# Patient Record
(demographics unavailable — no encounter records)

---

## 2025-02-20 NOTE — IMAGING
[de-identified] : Back / Spine: Inspection: no erythema and ecchymosis. Palpation: right lumbar paraspinal tenderness.  Range of motion:  Near full range of motion but with mild stiffness. Pain with lateral rotation. pain at extremes of flexion Strength Testing: Weakness with right ankle dorsiflexion and EHL strength Otherwise 5/5 throughout both lower extremities with normal tone  Neurological testing: light touch is intact throughout both lower extremities Straight Leg Raise: positive right [place] test: neg   RIGHT HIP and THIGH Inspection:  no ecchymosis, no rashes  Palpation: greater trochanter tenderness Range of Motion:  pain with external rotation Strength: 5/5 Flexion, 5/5 Exenstion, 5//5 Abduction, 5/5/ Adduction  Neurological: light touch is intact throughout Special Tests:  Impingement: neg Groin pain with IR: neg Leyla: pos Pain in posterior thigh with leg lift and knee bent: neg

## 2025-02-20 NOTE — ASSESSMENT
[FreeTextEntry1] : X-Ray Examination of the LUMBAR SPINE 2 views shows: disc space narrowing X-Ray Examination of the PELVIS 1 or 2 views shows: there are no fractures, subluxations or dislocations. deg changes   - We discussed their diagnosis and treatment options at length including the risks and benefits of both surgical and non-surgical options. Surgical risks include but are not limited to pain, infection, bleeding, vascular injury, numbness, tingling, nerve damage. - Due to risks of surgery, they will continue conservative treatment with PT and anti-inflammatory medications. - Rx given for Medrol dose pack - Discussed the possible side effects of medication along with the timing and frequency for taking. - fu in 6 weeks, If they do not make an appropriate improvement with therapy we discussed that we will obtain and MRI at their next visit.

## 2025-02-20 NOTE — HISTORY OF PRESENT ILLNESS
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 Has tried Advil, ibuprofen , HEP, PT in past with mild relief, working with

## 2025-04-17 NOTE — IMAGING
[de-identified] : Back / Spine: Inspection: no erythema and ecchymosis. Palpation: right lumbar paraspinal tenderness.  Range of motion:  Near full range of motion but with mild stiffness. Pain with lateral rotation. pain at extremes of flexion Strength Testing: Weakness with right ankle dorsiflexion and EHL strength Otherwise 5/5 throughout both lower extremities with normal tone  Neurological testing: light touch is intact throughout both lower extremities Straight Leg Raise: positive right [place] test: neg   RIGHT HIP and THIGH Inspection:  no ecchymosis, no rashes  Palpation: greater trochanter tenderness Range of Motion:  pain with external rotation Strength: 5/5 Flexion, 5/5 Exenstion, 5//5 Abduction, 5/5/ Adduction  Neurological: light touch is intact throughout Special Tests:  Impingement: neg Groin pain with IR: neg Leyla: pos Pain in posterior thigh with leg lift and knee bent: neg

## 2025-04-17 NOTE — ASSESSMENT
[FreeTextEntry1] :    - We discussed their diagnosis and treatment options at length including the risks and benefits of both surgical and non-surgical options. Surgical risks include but are not limited to pain, infection, bleeding, vascular injury, numbness, tingling, nerve damage. - Due to risks of surgery, they will continue conservative treatment with PT and anti-inflammatory medications. - The patient was provided with a prescription for Physical Therapy. - The patient is to continue home exercises learned at physical therapy. - The patient was advised to let pain guide the gradual advancement of activities.

## 2025-04-17 NOTE — HISTORY OF PRESENT ILLNESS
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 Has tried Advil, ibuprofen , HEP, PT in past with mild relief, working with   4/17/25- [facility] with improvement